# Patient Record
Sex: MALE
[De-identification: names, ages, dates, MRNs, and addresses within clinical notes are randomized per-mention and may not be internally consistent; named-entity substitution may affect disease eponyms.]

---

## 2018-09-21 ENCOUNTER — HOSPITAL ENCOUNTER (OUTPATIENT)
Dept: HOSPITAL 75 - SLEEP | Age: 63
LOS: 1 days | Discharge: HOME | End: 2018-09-22
Attending: FAMILY MEDICINE
Payer: COMMERCIAL

## 2018-09-21 DIAGNOSIS — G47.9: Primary | ICD-10-CM

## 2018-09-21 DIAGNOSIS — R06.83: ICD-10-CM

## 2018-09-21 PROCEDURE — 95810 POLYSOM 6/> YRS 4/> PARAM: CPT

## 2023-04-02 ENCOUNTER — HOSPITAL ENCOUNTER (EMERGENCY)
Dept: HOSPITAL 75 - ER | Age: 68
Discharge: HOME | End: 2023-04-02
Payer: COMMERCIAL

## 2023-04-02 VITALS — WEIGHT: 174.83 LBS | HEIGHT: 66.93 IN | BODY MASS INDEX: 27.44 KG/M2

## 2023-04-02 VITALS — DIASTOLIC BLOOD PRESSURE: 84 MMHG | SYSTOLIC BLOOD PRESSURE: 148 MMHG

## 2023-04-02 DIAGNOSIS — I10: ICD-10-CM

## 2023-04-02 DIAGNOSIS — R42: ICD-10-CM

## 2023-04-02 DIAGNOSIS — R11.2: Primary | ICD-10-CM

## 2023-04-02 LAB
ALBUMIN SERPL-MCNC: 4.3 GM/DL (ref 3.2–4.5)
ALP SERPL-CCNC: 52 U/L (ref 40–136)
ALT SERPL-CCNC: 39 U/L (ref 0–55)
APTT BLD: 29 SEC (ref 24–35)
BASOPHILS # BLD AUTO: 0.1 10^3/UL (ref 0–0.1)
BASOPHILS NFR BLD AUTO: 1 % (ref 0–10)
BILIRUB SERPL-MCNC: 0.8 MG/DL (ref 0.1–1)
BUN/CREAT SERPL: 19
CALCIUM SERPL-MCNC: 8.7 MG/DL (ref 8.5–10.1)
CHLORIDE SERPL-SCNC: 104 MMOL/L (ref 98–107)
CO2 SERPL-SCNC: 23 MMOL/L (ref 21–32)
CREAT SERPL-MCNC: 0.81 MG/DL (ref 0.6–1.3)
EOSINOPHIL # BLD AUTO: 0.1 10^3/UL (ref 0–0.3)
EOSINOPHIL NFR BLD AUTO: 1 % (ref 0–10)
GFR SERPLBLD BASED ON 1.73 SQ M-ARVRAT: 95 ML/MIN
GLUCOSE SERPL-MCNC: 113 MG/DL (ref 70–105)
HCT VFR BLD CALC: 47 % (ref 40–54)
HGB BLD-MCNC: 15.9 G/DL (ref 13.3–17.7)
INR PPP: 1 (ref 0.8–1.4)
LYMPHOCYTES # BLD AUTO: 3.3 10^3/UL (ref 1–4)
LYMPHOCYTES NFR BLD AUTO: 33 % (ref 12–44)
MAGNESIUM SERPL-MCNC: 2.2 MG/DL (ref 1.6–2.4)
MANUAL DIFFERENTIAL PERFORMED BLD QL: NO
MCH RBC QN AUTO: 32 PG (ref 25–34)
MCHC RBC AUTO-ENTMCNC: 34 G/DL (ref 32–36)
MCV RBC AUTO: 94 FL (ref 80–99)
MONOCYTES # BLD AUTO: 0.8 10^3/UL (ref 0–1)
MONOCYTES NFR BLD AUTO: 8 % (ref 0–12)
NEUTROPHILS # BLD AUTO: 5.6 10^3/UL (ref 1.8–7.8)
NEUTROPHILS NFR BLD AUTO: 56 % (ref 42–75)
PLATELET # BLD: 470 10^3/UL (ref 130–400)
PMV BLD AUTO: 9.4 FL (ref 9–12.2)
POTASSIUM SERPL-SCNC: 4 MMOL/L (ref 3.6–5)
PROT SERPL-MCNC: 7.5 GM/DL (ref 6.4–8.2)
PROTHROMBIN TIME: 13.9 SEC (ref 12.2–14.7)
SODIUM SERPL-SCNC: 140 MMOL/L (ref 135–145)
WBC # BLD AUTO: 10 10^3/UL (ref 4.3–11)

## 2023-04-02 PROCEDURE — 36415 COLL VENOUS BLD VENIPUNCTURE: CPT

## 2023-04-02 PROCEDURE — 71045 X-RAY EXAM CHEST 1 VIEW: CPT

## 2023-04-02 PROCEDURE — 93005 ELECTROCARDIOGRAM TRACING: CPT

## 2023-04-02 PROCEDURE — 83735 ASSAY OF MAGNESIUM: CPT

## 2023-04-02 PROCEDURE — 85025 COMPLETE CBC W/AUTO DIFF WBC: CPT

## 2023-04-02 PROCEDURE — 85730 THROMBOPLASTIN TIME PARTIAL: CPT

## 2023-04-02 PROCEDURE — 85610 PROTHROMBIN TIME: CPT

## 2023-04-02 PROCEDURE — 80053 COMPREHEN METABOLIC PANEL: CPT

## 2023-04-02 PROCEDURE — 84484 ASSAY OF TROPONIN QUANT: CPT

## 2023-04-02 PROCEDURE — 93041 RHYTHM ECG TRACING: CPT

## 2023-04-02 PROCEDURE — 83874 ASSAY OF MYOGLOBIN: CPT

## 2023-04-02 NOTE — ED GENERAL
General


Chief Complaint:  Dizziness/Syncope


Stated Complaint:  DIZZY


Nursing Triage Note:  


PT BROUGHT IN BY EMS FOR SUDDEN ONSET DIZZINESS, VOMITING, AND DIAPHORESIS THAT 


BEGAN AT 1130. PT IS CLAMMY UPON ARRIVAL. RECEIVED ZOFRAN EN ROUTE BY EMS. PT 


DENIES CP.


Source of Information:  Patient


Exam Limitations:  No Limitations





History of Present Illness


Date Seen by Provider:  2023


Time Seen by Provider:  12:38


Initial Comments


Here by EMS with acute onset of dizziness while at home and this also resulted 

in vomiting and diaphoresis.  Denies chest pain or breathing problems.  He 

states he was doing the dishes when this came about.  EMS reports that the 

patient was clammy with nausea and vomiting.  They did give ondansetron 4 mg IV 

and that has helped.  Due to dizziness and sweating as well as concerns about 

abnormal EKG, EMS elected to bring him here where we have cardiology available. 

Patient arrives feeling better.  EMS reports normal vital signs overall with 

questions of right bundle branch block and minimal elevation in leads V2 and V3


Timing/Duration:  1/2 Hour, Changing Over Time, Gone Now


Severity:  Moderate


Associated Systoms:  No Cough; Diaphoresis, Nausea/Vomiting; No Shortness of 

Air; Weakness





Allergies and Home Medications


Allergies


Coded Allergies:  


     No Known Drug Allergies (Unverified , 23)





Patient Home Medication List


Home Medication List Reviewed:  Yes





Review of Systems


Review of Systems


Constitutional:  see HPI; No chills; diaphoresis; No fever


EENTM:  no symptoms reported


Respiratory:  cough (Intermittent over the last week but improving); No short of

breath


Cardiovascular:  No chest pain, No palpitations


Gastrointestinal:  nausea, vomiting


Genitourinary:  no symptoms reported


Musculoskeletal:  No back pain


Skin:  no symptoms reported


Psychiatric/Neurological:  No Symptoms Reported





Past Medical-Social-Family Hx


Patient Social History


Tobacco Use?:  No


Substance use?:  No


Alcohol Use?:  Yes


Alcohol type:  Wine


Alcohol Frequency:  Daily


Pt feels they are or have been:  No





Immunizations Up To Date


Influenza Vaccine Up-to-Date:  Yes; Up-to-Date





Past Medical History


Surgeries:  No


Respiratory:  No


Cardiac:  Yes


High Cholesterol


Neurological:  No


Gastrointestinal:  No


Musculoskeletal:  No


Endocrine:  No





Family Medical History


Reviewed and Corrections made


No Pertinent Family Hx





Physical Exam


Vital Signs





Vital Signs - First Documented








 23





 12:34


 


Temp 36.4


 


Pulse 66


 


Resp 21


 


B/P (MAP) 172/94 (120)


 


Pulse Ox 93


 


O2 Delivery Room Air





Capillary Refill : Less Than 3 Seconds


Height, Weight, BMI


Height: '"


Weight: lbs. oz. kg; 27.00 BMI


Method:


General Appearance:  No Apparent Distress, WD/WN


HEENT:  PERRL/EOMI, Pharynx Normal


Neck:  Non Tender, Supple


Respiratory:  Lungs Clear, Normal Breath Sounds


Cardiovascular:  Regular Rate, Rhythm, No Murmur


Gastrointestinal:  Non Tender, Soft


Extremity:  Normal Range of Motion, Non Tender, No Calf Tenderness, No Pedal Ed

ema


Neurologic/Psychiatric:  Alert, Oriented x3


Skin:  Normal Color, Warm/Dry





Progress/Results/Core Measures


Suspected Sepsis


SIRS


Temperature: 


Pulse: 66 


Respiratory Rate: 21


 


Laboratory Tests


23 12:40: White Blood Count 10.0


Blood Pressure 172 /94 


Mean: 120


 





Laboratory Tests


23 12:40: 


Creatinine 0.81, INR Comment 1.0, Platelet Count 470H, Total Bilirubin 0.8








Results/Orders


Lab Results





Laboratory Tests








Test


 23


12:40 23


14:43 Range/Units


 


 


White Blood Count


 10.0 


 


 4.3-11.0


10^3/uL


 


Red Blood Count


 5.02 


 


 4.30-5.52


10^6/uL


 


Hemoglobin 15.9   13.3-17.7  g/dL


 


Hematocrit 47   40-54  %


 


Mean Corpuscular Volume 94   80-99  fL


 


Mean Corpuscular Hemoglobin 32   25-34  pg


 


Mean Corpuscular Hemoglobin


Concent 34 


 


 32-36  g/dL





 


Red Cell Distribution Width 13.6   10.0-14.5  %


 


Platelet Count


 470 H


 


 130-400


10^3/uL


 


Mean Platelet Volume 9.4   9.0-12.2  fL


 


Immature Granulocyte % (Auto) 2    %


 


Neutrophils (%) (Auto) 56   42-75  %


 


Lymphocytes (%) (Auto) 33   12-44  %


 


Monocytes (%) (Auto) 8   0-12  %


 


Eosinophils (%) (Auto) 1   0-10  %


 


Basophils (%) (Auto) 1   0-10  %


 


Neutrophils # (Auto)


 5.6 


 


 1.8-7.8


10^3/uL


 


Lymphocytes # (Auto)


 3.3 


 


 1.0-4.0


10^3/uL


 


Monocytes # (Auto)


 0.8 


 


 0.0-1.0


10^3/uL


 


Eosinophils # (Auto)


 0.1 


 


 0.0-0.3


10^3/uL


 


Basophils # (Auto)


 0.1 


 


 0.0-0.1


10^3/uL


 


Immature Granulocyte # (Auto)


 0.2 H


 


 0.0-0.1


10^3/uL


 


Prothrombin Time 13.9   12.2-14.7  SEC


 


INR Comment 1.0   0.8-1.4  


 


Activated Partial


Thromboplast Time 29 


 


 24-35  SEC





 


Sodium Level 140   135-145  MMOL/L


 


Potassium Level 4.0   3.6-5.0  MMOL/L


 


Chloride Level 104     MMOL/L


 


Carbon Dioxide Level 23   21-32  MMOL/L


 


Anion Gap 13   5-14  MMOL/L


 


Blood Urea Nitrogen 15   7-18  MG/DL


 


Creatinine


 0.81 


 


 0.60-1.30


MG/DL


 


Estimat Glomerular Filtration


Rate 95 


 


  





 


BUN/Creatinine Ratio 19    


 


Glucose Level 113 H    MG/DL


 


Calcium Level 8.7   8.5-10.1  MG/DL


 


Corrected Calcium 8.5   8.5-10.1  MG/DL


 


Magnesium Level 2.2   1.6-2.4  MG/DL


 


Total Bilirubin 0.8   0.1-1.0  MG/DL


 


Aspartate Amino Transf


(AST/SGOT) 28 


 


 5-34  U/L





 


Alanine Aminotransferase


(ALT/SGPT) 39 


 


 0-55  U/L





 


Alkaline Phosphatase 52     U/L


 


Myoglobin


 55.0 


 


 10.0-92.0


NG/ML


 


Troponin I < 0.028  < 0.028  <0.028  NG/ML


 


Total Protein 7.5   6.4-8.2  GM/DL


 


Albumin 4.3   3.2-4.5  GM/DL








My Orders





Orders - JOSE CARLTON MD


Ns Iv 500 Ml (Sodium Chloride 0.9%) (23 12:45)


Cbc With Automated Diff (23 12:45)


Magnesium (23 12:45)


Chest 1 View, Ap/Pa Only (23 12:45)


Comprehensive Metabolic Panel (23 12:45)


Myoglobin Serum (23 12:45)


Protime With Inr (23 12:45)


Partial Thromboplastin Time (23 12:45)


O2 (23 12:45)


Monitor-Rhythm Ecg Trace Only (23 12:45)


Lipid Panel (4/3/23 06:00)


Ed Iv/Invasive Line Start (23 12:45)


Troponin I [place] (23 12:45)


Aspirin Chewable Tablet (Baby Aspirin Ch (23 12:45)


Troponin I [place] (23 14:40)





Medications Given in ED





Current Medications








 Medications  Dose


 Ordered  Sig/Dean


 Route  Start Time


 Stop Time Status Last Admin


Dose Admin


 


 Aspirin  324 mg  ONCE  ONCE


 PO  23 12:45


 23 12:48 DC 23 12:52


324 MG


 


 Sodium Chloride  500 ml @ 0


 mls/hr  Q0M ONCE


 IV  23 12:45


 23 12:48 DC 23 12:52


500 MLS/HR








Vital Signs/I&O











 23





 12:34


 


Temp 36.4


 


Pulse 66


 


Resp 21


 


B/P (MAP) 172/94 (120)


 


Pulse Ox 93


 


O2 Delivery Room Air





Capillary Refill : Less Than 3 Seconds








Blood Pressure Mean:                    120








Progress Note :  


Progress Note


Seen and evaluated.  IV, labs including CBC, CMP, troponin and coags ordered.  

Chest x-ray and EKG ordered.  Aspirin 324 mg p.o. as well as normal saline 500 

mL bolus ordered.  Monitor patient.





Differential includes cardiac event, electrolyte disturbance, GI episode





1340: I have reviewed labs.  CBC is grossly normal as well as CMP is grossly 

normal.  Troponin is negative.  There are no disturbance in coags.  Patient is 

free of symptoms.  We will repeat troponin at 1440 and if he remains symptom-

free and repeat troponin is negative, we will discharge him home with plans for 

follow-up with cardiology.  This was discussed with the patient and family who 

agree.  Monitor patient.  1543: Repeat troponin is negative.  Patient remains 

pain and symptom-free.  1348: I did discuss the case with Dr Koroma on-call for 

cardiology.  We will initiate Toprol-XL 50 mg p.o. now.  He is recommending 

continuing that daily as well as continuing patient's previously prescribed 

Lipitor which I will represcribe.  He is also recommending ASA 81 mg daily and 

he will see him in clinic..  Patient is to call for appointment this week.  All 

of this was discussed with patient and family who agree.  Discharged home with 

return precautions.  Patient and family verbalized understanding instructions 

and agreement with plan.





ECG


Initial ECG Impression Date:  2023


Initial ECG Impression Time:  12:37


Initial ECG Rate:  67


Initial ECG Rhythm:  Normal Sinus


Comment


Sinus rhythm with right bundle branch block, left anterior fascicular block and 

left axis deviation.  No evidence of ST elevation MI.  No previous available for

comparison.  Interpreted by me.





Diagnostic Imaging





   Diagonstic Imaging:  Xray


   Plain Films/CT/US/NM/MRI:  chest


Comments


                 [facility].


                                [place], [place]





NAME:   ANTONY ROWE


MED REC#:   [id number]


ACCOUNT#:   [id number]


PT STATUS:   REG ER


:   1955


PHYSICIAN:   JOSE CARLTON MD


ADMIT DATE:   23/ER


                                  ***Signed***


Date of Exam:23





CHEST 1 VIEW, AP/PA ONLY








INDICATION: Chest pain and dizziness. Vomiting and diaphoresis.





COMPARISON: None available.





FINDINGS:


The lungs appear clear without focal airspace opacities or


consolidation. There are no findings of an effusion. There is no


evidence of a pneumothorax. Heart size and mediastinal contours


appear appropriate. Pulmonary vascularity appears within normal


limits. There is no acute or suspicious osseous abnormality


demonstrated.





IMPRESSION:


No radiographic evidence of an acute cardiopulmonary process.





Dictated by: 





  Dictated on workstation # [id number]








Dict:   23 1308


Trans:   23 1309


[facility] [id number]





Interpreted by:     TARIQ LIZARRAGA MD


Electronically signed by: TARIQ LIZARRAGA MD 23 1309





Departure


Impression





   Primary Impression:  


   Dizziness


   Additional Impressions:  


   Nausea


   Hypertension


   Qualified Codes:  I10 - Essential (primary) hypertension


Disposition:  01 HOME, SELF-CARE


Condition:  Improved





Departure-Patient Inst.


Decision time for Depature:  15:49


Referrals:  


DENISE KOROMA MD FACP FAC CCDS





MIA EDWARDS DO (PCP/Family)


Primary Care Physician


Patient Instructions:  High Blood Pressure ED, Dizziness, Adult ED, Nausea and 

Vomiting, Adult ED





Add. Discharge Instructions:  








All discharge instructions reviewed with patient and/or family. Voiced 

understanding.





Drink plenty of fluids by taking small sips frequently.  Light diet for the next

24 hours and then advance as tolerated.  Take other medications as directed.  

You should take a baby aspirin 81 mg tablet daily.  The coated version will help

prevent stomach upset.  Take other medications as directed.  Follow-up with Dr Koroma this week for recheck and further evaluation.  Call his office in the 

morning and let them know you were seen in the emergency department and the case

was discussed with him and he would like to follow-up with you this week.  

Return for chest pain, weakness, vomiting, breathing problems, inappropriate 

sweating or other concerns as needed.


Scripts


Metoprolol Succinate (Metoprolol Succinate) 50 Mg Tab.er.24h


50 MG PO DAILY for 30 Days, #30 TAB 0 Refills


   Prov: JOSE CARLTON MD         23 


Atorvastatin Calcium (Atorvastatin Calcium) 40 Mg Tablet


40 MG PO DAILY for 30 Days, #30 TAB


   Prov: JOSE CARLTON MD         23











JOSE CARLTON MD           2023 12:48

## 2023-04-02 NOTE — DIAGNOSTIC IMAGING REPORT
INDICATION: Chest pain and dizziness. Vomiting and diaphoresis.



COMPARISON: None available.



FINDINGS:

The lungs appear clear without focal airspace opacities or

consolidation. There are no findings of an effusion. There is no

evidence of a pneumothorax. Heart size and mediastinal contours

appear appropriate. Pulmonary vascularity appears within normal

limits. There is no acute or suspicious osseous abnormality

demonstrated.



IMPRESSION:

No radiographic evidence of an acute cardiopulmonary process.



Dictated by: 



  Dictated on workstation # [id number]

## 2023-04-28 ENCOUNTER — HOSPITAL ENCOUNTER (OUTPATIENT)
Dept: HOSPITAL 75 - CARD | Age: 68
End: 2023-04-28
Attending: INTERNAL MEDICINE
Payer: COMMERCIAL

## 2023-04-28 DIAGNOSIS — I51.7: Primary | ICD-10-CM

## 2023-04-28 PROCEDURE — 93306 TTE W/DOPPLER COMPLETE: CPT

## 2023-05-02 ENCOUNTER — HOSPITAL ENCOUNTER (OUTPATIENT)
Dept: HOSPITAL 75 - CARD | Age: 68
End: 2023-05-02
Attending: NURSE PRACTITIONER
Payer: COMMERCIAL

## 2023-05-02 DIAGNOSIS — R94.31: Primary | ICD-10-CM

## 2023-05-02 PROCEDURE — 78452 HT MUSCLE IMAGE SPECT MULT: CPT

## 2023-05-02 PROCEDURE — 93017 CV STRESS TEST TRACING ONLY: CPT

## 2023-05-03 NOTE — STRESS TEST
DATE OF SERVICE: 05/02/2023



RESTING AND POST REGADENOSON TECHNETIUM-99M TETROFOSMIN SPECT CT IMAGING



ORDERING PHYSICIAN:

CLAUDIA Lucia.



PRIMARY PHYSICIAN:

Dr. Maya.



OTHER PHYSICIAN:

Dr. Koroma.



CLINICAL DIAGNOSIS:

Abnormal electrocardiogram.



Baseline images were carried out after injection of 10.46 mCi technetium-99m 

tetrofosmin.  This was followed by 0.4 mg regadenoson and 31.9 mCi of 

technetium-99m tetrofosmin for stress imaging.  The electrocardiogram showed 

sinus bradycardia with right bundle branch block and left anterior fascicular 

block.  The electrocardiogram remained unchanged during the study.  The patient 

tolerated the procedure well.



Review of images at rest and following stress does not indicate any distinct 

perfusion defects consistent with significant myocardial ischemia or infarction.

 Gated images show normal global left ventricular systolic function with normal 

regional wall motion.  Left ventricular ejection fraction is calculated to be 

70%.



CONCLUSIONS:

1.  No evidence of any significant myocardial ischemia or infarction on this 

study.

2.  Normal regional wall motion.

3.  Normal global left ventricular systolic function with a calculated ejection 

fraction 70%.





Job ID: [id number]

DocumentID: [id number]

Dictated Date: 05/03/2023 12:46:02

Transcription Date: 05/03/2023 16:25:00

Dictated By: DENISE KOROMA MD; MA; FACP; FACC;